# Patient Record
Sex: FEMALE | Race: AMERICAN INDIAN OR ALASKA NATIVE
[De-identification: names, ages, dates, MRNs, and addresses within clinical notes are randomized per-mention and may not be internally consistent; named-entity substitution may affect disease eponyms.]

---

## 2018-08-18 ENCOUNTER — HOSPITAL ENCOUNTER (EMERGENCY)
Dept: HOSPITAL 5 - ED | Age: 68
LOS: 1 days | Discharge: HOME | End: 2018-08-19
Payer: MEDICARE

## 2018-08-18 DIAGNOSIS — M25.551: Primary | ICD-10-CM

## 2018-08-18 DIAGNOSIS — F41.9: ICD-10-CM

## 2018-08-18 DIAGNOSIS — I10: ICD-10-CM

## 2018-08-18 PROCEDURE — 73502 X-RAY EXAM HIP UNI 2-3 VIEWS: CPT

## 2018-08-18 PROCEDURE — 96372 THER/PROPH/DIAG INJ SC/IM: CPT

## 2018-08-18 PROCEDURE — 99283 EMERGENCY DEPT VISIT LOW MDM: CPT

## 2018-08-19 VITALS — SYSTOLIC BLOOD PRESSURE: 145 MMHG | DIASTOLIC BLOOD PRESSURE: 89 MMHG

## 2018-08-19 NOTE — EMERGENCY DEPARTMENT REPORT
ED Extremity Problem HPI





- General


Chief complaint: Extremity Problem,Nontraumatic


Stated complaint: RIGHT HIP PAIN


Time Seen by Provider: 08/19/18 06:09


Source: patient


Mode of arrival: Ambulatory


Limitations: No Limitations





- History of Present Illness


Initial comments: 





67-year-old -American female comes to the emergency room complaining of 

right hip pain 1 week.  Patient admits to doing yardwork and raking.  Patient 

denies any trauma no falls no direct blows.  Patient reports that she went to 

her primary care provider name gave her tramadol.  Patient points the trauma 

daughter does not help with her pain.  She does admit to taking over-the-

counter Motrin which she reports has helped but came back.  Patient has a past 

medical history of diabetes currently takes oral medication for that.  As well 

as borderline hypertension.  Patient reports that the pain is worse when 

getting up out of the chair and started to move.  It improves with movement.  

Then returns back after she has sat for a while.


MD Complaint: extremity pain


-: week(s) (1)


Location: right, lower extremity


History of Same: No


-: Yes arthralgia


Radiation: none


Severity scale (0 -10): 10


Quality: aching, sharp


Consistency: intermittent


Improves with: movement


Worsens with: other (getting up out of the chair)


Associated Symptoms: denies other symptoms





- Related Data


 Previous Rx's











 Medication  Instructions  Recorded  Last Taken  Type


 


Ibuprofen [Motrin 600 MG tab] 600 mg PO Q8H PRN #30 tablet 08/19/18 Unknown Rx











 Allergies











Allergy/AdvReac Type Severity Reaction Status Date / Time


 


No Known Allergies Allergy   Verified 01/08/16 04:18














ED Review of Systems


ROS: 


Stated complaint: RIGHT HIP PAIN


Other details as noted in HPI





Comment: All other systems reviewed and negative


Musculoskeletal: arthralgia (hip pain)





ED Past Medical Hx





- Past Medical History


Hx Hypertension: Yes


Hx Psychiatric Treatment: Yes (ANXIETY)





- Social History


Smoking Status: Never Smoker


Substance Use Type: None





- Medications


Home Medications: 


 Home Medications











 Medication  Instructions  Recorded  Confirmed  Last Taken  Type


 


Ibuprofen [Motrin 600 MG tab] 600 mg PO Q8H PRN #30 tablet 08/19/18  Unknown Rx














ED Physical Exam





- General


Limitations: No Limitations


General appearance: alert, in no apparent distress





- Head


Head exam: Present: atraumatic, normocephalic





- Eye


Eye exam: Present: EOMI





- ENT


ENT exam: Present: mucous membranes moist





- Expanded Lower Extremity Exam


  ** Right


Hip exam: Present: tenderness (trochanter).  Absent: swelling, abrasion


Knee exam: Present: normal inspection, full ROM.  Absent: tenderness


Lower Leg exam: Present: normal inspection, full ROM.  Absent: tenderness


Neuro vascular tendon exam: Present: no vascular compromise





- Back Exam


Back exam: Present: normal inspection





- Neurological Exam


Neurological exam: Present: alert, oriented X3





- Psychiatric


Psychiatric exam: Present: normal affect, normal mood





- Skin


Skin exam: Present: warm, dry, intact, normal color.  Absent: rash





ED Course





 Vital Signs











  08/18/18 08/18/18 08/19/18





  23:06 23:27 04:55


 


Temperature 98.0 F 98 F 98.0 F


 


Pulse Rate 69 68 56 L


 


Respiratory 18 16 18





Rate   


 


Blood Pressure 163/84 163/84 153/79


 


O2 Sat by Pulse 98 98 99





Oximetry   














ED Medical Decision Making





- Radiology Data


Radiology results: report reviewed, image reviewed





FINAL REPORT 





PROCEDURE: XR HIP 2-3V RT 





TECHNIQUE: RIGHT hip radiographs, AP and lateral views. 





HISTORY: hip pain 





COMPARISON: No prior studies are available for comparison. 





FINDINGS: 


Fracture (s) and/or Dislocation(s): None . 





Joint space(s): Normal . 





Soft tissues: Normal . 





Bone mineralization: Normal . 





Foreign bodies: None . 











IMPRESSION: 


Normal Examination. 











Transcribed By: [place] 


Dictated By: NOAH BRICEÑO MD 


Electronically Authenticated By: NOAH BRICEÑO MD 


Signed Date/Time: 08/19/18 0010 











- Medical Decision Making





Patient has been evaluated by this provider fast track.


X-ray of right hip shows normal examination.


Patient will be given a Toradol injection 30 mg IM.


Discussed the patient she can try over-the-counter ibuprofen.


Discussed the patient to follow up with her primary care provider.


Critical care attestation.: 


If time is entered above; I have spent that time in minutes in the direct care 

of this critically ill patient, excluding procedure time.








ED Disposition


Clinical Impression: 


 Acute right hip pain





Disposition: DC-01 TO HOME OR SELFCARE


Is pt being admited?: No


Does the pt Need Aspirin: No


Condition: Stable


Instructions:  Arthralgia (ED)


Additional Instructions: 


Please take pain medication as prescribed.  If his symptoms persist or gets 

worse please follow-up with her primary care provider or an orthopedic provider.


Prescriptions: 


Ibuprofen [Motrin 600 MG tab] 600 mg PO Q8H PRN #30 tablet


 PRN Reason: Pain


Referrals: 


DIMAS RUDD MD [Primary Care Provider] - 3-5 Days


ZAIRA HAN MD [Staff Physician] - 3-5 Days


Forms:  Work/School Release Form(ED)

## 2018-08-19 NOTE — XRAY REPORT
FINAL REPORT



PROCEDURE:  XR HIP 2-3V RT



TECHNIQUE:  RIGHT hip radiographs, AP and lateral views. 



HISTORY:  hip pain 



COMPARISON:  No prior studies are available for comparison.



FINDINGS:  

Fracture (s) and/or Dislocation(s): None .



Joint space(s): Normal .



Soft tissues: Normal .



Bone mineralization: Normal .



Foreign bodies: None .







IMPRESSION:  

Normal Examination.